# Patient Record
(demographics unavailable — no encounter records)

---

## 2024-11-04 NOTE — REVIEW OF SYSTEMS
[Fever] : no fever [Chills] : no chills [Fatigue] : no fatigue [Discharge] : no discharge [Pain] : no pain [Earache] : no earache [Hearing Loss] : no hearing loss [Chest Pain] : no chest pain [Palpitations] : no palpitations [Leg Claudication] : no leg claudication [Shortness Of Breath] : no shortness of breath [Wheezing] : no wheezing [Cough] : no cough [Abdominal Pain] : no abdominal pain [Nausea] : no nausea [Dysuria] : no dysuria [Incontinence] : no incontinence [Joint Pain] : no joint pain [Joint Stiffness] : no joint stiffness [Joint Swelling] : no joint swelling [Itching] : no itching [Mole Changes] : no mole changes [Nail Changes] : no nail changes [Headache] : no headache [Dizziness] : no dizziness [Fainting] : no fainting [Suicidal] : not suicidal [Insomnia] : no insomnia [Easy Bleeding] : no easy bleeding [Easy Bruising] : no easy bruising

## 2024-11-04 NOTE — HISTORY OF PRESENT ILLNESS
[FreeTextEntry1] : Follow up visit [de-identified] : Patient is 74 year female  with PMH of  HTN, Hyperlipidemia, Hypothyroidism, Thrombocytosis ,  Seeing Urologist for microhematuria- last seen in 02/2024 Stopped taking HCTZ, and Simvastatin Recently seen by Cardiologist at [facility] on 09/2024- lab results reviewed and d/w  the patient  Currently on Hydroxyuria 500 mg as directed Levothyroxine 75 mcg Olmesartan 20 mg QD Rosuvastatin 20 mg QHS Vagifem 10 mg ( estrogen suppository) Venlafaxine 225 mg QD Vit D 2000 units QD, Fish oil, Ca/Vit D3 Glucosamine, Probiotics

## 2025-02-06 NOTE — HISTORY OF PRESENT ILLNESS
[de-identified] : This is a 74 year old woman with a history of hypertension, hypothyroidism, hyperlipidemia.  She presents for the evaluation of a worsening thrombocytosis.  Was normal 2 years ago, now increased to 586 this past 6/21/22.  WBC 7.6 Hg 13.4g/dl currently. She feels well and has no systemic symptoms or complaints.  No personal or family history of venous thrombosis.   [de-identified] :  {Patient remains on hydrea 100mg MWF 500mg TTSS.  Patient feeling well denies foot ulcers, mouth ulcers or any other side effects from the hydrea. WBC normal, Hg 13.4g/dl plates 365 normal CBC overall. Continue current dose.

## 2025-02-06 NOTE — ASSESSMENT
[FreeTextEntry1] : This is a 74 year old woman with a history of hypertension, hypothyroidism, hyperlipidemia.  She presents for the evaluation of a worsening thrombocytosis.  Was normal 2 years ago, now increased to 586 this past 6/21/22.   Will continue the previous dose of Hydrea that seems to be well tolerated so far, 1000mg on MWF 500mg on TTSS given that this is the maximum dose that seems to be tolerable.  ET appears well controlled.  Hg 12.4g/dl, WBC 5.35 doses at current schedule well tolerated from hematologic standpoint. Plates well controlled at 365k/dl.    Patient doing well on current dose. FUF489.3 suggests good compliance with therapy.  Follow up in 3 months for continued monitoring.

## 2025-02-06 NOTE — HISTORY OF PRESENT ILLNESS
[de-identified] : This is a 74 year old woman with a history of hypertension, hypothyroidism, hyperlipidemia.  She presents for the evaluation of a worsening thrombocytosis.  Was normal 2 years ago, now increased to 586 this past 6/21/22.  WBC 7.6 Hg 13.4g/dl currently. She feels well and has no systemic symptoms or complaints.  No personal or family history of venous thrombosis.   [de-identified] :  {Patient remains on hydrea 100mg MWF 500mg TTSS.  Patient feeling well denies foot ulcers, mouth ulcers or any other side effects from the hydrea. WBC normal, Hg 13.4g/dl plates 365 normal CBC overall. Continue current dose.

## 2025-02-06 NOTE — ASSESSMENT
[FreeTextEntry1] : This is a 74 year old woman with a history of hypertension, hypothyroidism, hyperlipidemia.  She presents for the evaluation of a worsening thrombocytosis.  Was normal 2 years ago, now increased to 586 this past 6/21/22.   Will continue the previous dose of Hydrea that seems to be well tolerated so far, 1000mg on MWF 500mg on TTSS given that this is the maximum dose that seems to be tolerable.  ET appears well controlled.  Hg 12.4g/dl, WBC 5.35 doses at current schedule well tolerated from hematologic standpoint. Plates well controlled at 365k/dl.    Patient doing well on current dose. NPT878.3 suggests good compliance with therapy.  Follow up in 3 months for continued monitoring.

## 2025-07-03 NOTE — HISTORY OF PRESENT ILLNESS
[FreeTextEntry1] :  Annual physical exam [de-identified] : Patient is 74 year female  with PMH of  HTN, Hyperlipidemia, Hypothyroidism, Thrombocytosis , came today for annual physical exam Today patient feels OK Seeing Urologist for microhematuria- last seen in 02/2025, next appointment will be in 08/2025 Last cardiology consult was in 09/2024- s/p Stress test done in 09/2024 with Dr. Lorenzo- we will get the reports Patient intentionally lost weight  Currently on Blood Pressure Kit Device; Check blood pressure as directed Calcium 600+D 600-400 MG-UNIT TABS; TAKE 2 TABLET Daily Fish Oil 1000 MG Oral Capsule; TAKE 2 CAPSULE Daily Glucosamine-Chondroitin Oral Tablet; Take 1 tablet twice daily Hydroxyurea 500 MG Oral Capsule; TAKE 1 CAPSULE BY MOUTH DAILY TUE,THU,SAT & SUN. TAKE 2 CAPSULES BY MOUTH ON MON,WED & FRI Levothyroxine Sodium 75 MCG Oral Tablet; Take 1 tablet daily Olmesartan Medoxomil 20 MG Oral Tablet; TAKE 1 TABLET BY MOUTH EVERY DAY Probiotic Daily Oral Capsule; Take 1 capsule twice daily Restasis 0.05 % Ophthalmic Emulsion; 1 gtt each eye q 12 hr Rosuvastatin Calcium 20 MG Oral Tablet Venlafaxine HCl  MG Oral Tablet Extended Release 24 Hour; Take 1 tablet daily Vitamin D 50 MCG (2000 UT) Oral Capsule; TAKE 1 CAPSULE Daily

## 2025-07-03 NOTE — HEALTH RISK ASSESSMENT
[Good] : ~his/her~  mood as  good [1 or 2 (0 pts)] : 1 or 2 (0 points) [Never (0 pts)] : Never (0 points) [No] : In the past 12 months have you used drugs other than those required for medical reasons? No [No falls in past year] : Patient reported no falls in the past year [Little interest or pleasure doing things] : 1) Little interest or pleasure doing things [Feeling down, depressed, or hopeless] : 2) Feeling down, depressed, or hopeless [0] : 2) Feeling down, depressed, or hopeless: Not at all (0) [PHQ-2 Negative - No further assessment needed] : PHQ-2 Negative - No further assessment needed [Former] : Former [> 15 Years] : > 15 Years [No Retinopathy] : No retinopathy [Patient reported mammogram was normal] : Patient reported mammogram was normal [Patient reported PAP Smear was normal] : Patient reported PAP Smear was normal [Patient reported bone density results were normal] : Patient reported bone density results were normal [Patient reported colonoscopy was abnormal] : Patient reported colonoscopy was abnormal [HIV test declined] : HIV test declined [Hepatitis C test declined] : Hepatitis C test declined [None] : None [With Family] : lives with family [] :  [# Of Children ___] : has [unfilled] children [Feels Safe at Home] : Feels safe at home [Neglect Or Abandonment] : neglect or abandonment [Fully functional (bathing, dressing, toileting, transferring, walking, feeding)] : Fully functional (bathing, dressing, toileting, transferring, walking, feeding) [Fully functional (using the telephone, shopping, preparing meals, housekeeping, doing laundry, using] : Fully functional and needs no help or supervision to perform IADLs (using the telephone, shopping, preparing meals, housekeeping, doing laundry, using transportation, managing medications and managing finances) [Smoke Detector] : smoke detector [Carbon Monoxide Detector] : carbon monoxide detector [Seat Belt] :  uses seat belt [Sunscreen] : uses sunscreen [Aggressive treatment] : aggressive treatment [Time Spent: ___ minutes] : Time Spent: [unfilled] minutes [Time Spent: ___ Minutes] : I spent [unfilled] minutes performing a depression screening for this patient. [Yes] : takes [Retired] : retired [2 - 4 times a month (2 pts)] : 2-4 times a month (2 points) [Audit-CScore] : 2 [de-identified] : exercises [de-identified] : variable [de-identified] : [facility] , no injuries [de-identified] : well controlled with Effexor [SIB3Shebh] : 0 [de-identified] : quit 1980 [EyeExamDate] : 08/2023 [Change in mental status noted] : No change in mental status noted [Reports changes in hearing] : Reports no changes in hearing [Reports changes in vision] : Reports no changes in vision [MammogramDate] : 02/2025 [MammogramComments] : report is pending [PapSmearDate] : 02/2024 [BoneDensityDate] : 2023 [ColonoscopyDate] : 2022 [ColonoscopyComments] : polyp [AdvancecareDate] : 07/2025 Full Thickness Lip Wedge Repair (Flap) Text: Given the location of the defect and the proximity to free margins a full thickness wedge repair was deemed most appropriate. Using a sterile surgical marker, the appropriate repair was drawn incorporating the defect and placing the expected incisions perpendicular to the vermilion border.  The vermilion border was also meticulously outlined to ensure appropriate reapproximation during the repair.  The area thus outlined was incised through and through with a #15 scalpel blade.  The muscularis and dermis were reaproximated with deep sutures following hemostasis. Care was taken to realign the vermilion border before proceeding with the superficial closure.  Once the vermilion was realigned the superfical and mucosal closure was finished.

## 2025-07-03 NOTE — ASSESSMENT
[Vaccines Reviewed] : Immunizations reviewed today. Please see immunization details in the vaccine log within the immunization flowsheet.  [FreeTextEntry1] : Come back for f/u in 3 month